# Patient Record
Sex: FEMALE | Race: ASIAN
[De-identification: names, ages, dates, MRNs, and addresses within clinical notes are randomized per-mention and may not be internally consistent; named-entity substitution may affect disease eponyms.]

---

## 2018-05-25 ENCOUNTER — HOSPITAL ENCOUNTER (OUTPATIENT)
Dept: HOSPITAL 17 - PHED | Age: 73
Setting detail: OBSERVATION
LOS: 1 days | Discharge: HOME | End: 2018-05-26
Attending: HOSPITALIST | Admitting: HOSPITALIST
Payer: MEDICARE

## 2018-05-25 VITALS
OXYGEN SATURATION: 98 % | RESPIRATION RATE: 16 BRPM | HEART RATE: 77 BPM | DIASTOLIC BLOOD PRESSURE: 78 MMHG | SYSTOLIC BLOOD PRESSURE: 133 MMHG

## 2018-05-25 VITALS — RESPIRATION RATE: 16 BRPM | SYSTOLIC BLOOD PRESSURE: 144 MMHG | DIASTOLIC BLOOD PRESSURE: 77 MMHG

## 2018-05-25 VITALS
SYSTOLIC BLOOD PRESSURE: 133 MMHG | DIASTOLIC BLOOD PRESSURE: 78 MMHG | HEART RATE: 80 BPM | RESPIRATION RATE: 16 BRPM | OXYGEN SATURATION: 97 %

## 2018-05-25 VITALS
OXYGEN SATURATION: 99 % | SYSTOLIC BLOOD PRESSURE: 146 MMHG | RESPIRATION RATE: 16 BRPM | DIASTOLIC BLOOD PRESSURE: 72 MMHG | HEART RATE: 86 BPM

## 2018-05-25 VITALS
DIASTOLIC BLOOD PRESSURE: 68 MMHG | HEART RATE: 85 BPM | RESPIRATION RATE: 18 BRPM | SYSTOLIC BLOOD PRESSURE: 127 MMHG | TEMPERATURE: 98.3 F | OXYGEN SATURATION: 98 %

## 2018-05-25 VITALS
HEART RATE: 88 BPM | OXYGEN SATURATION: 99 % | SYSTOLIC BLOOD PRESSURE: 139 MMHG | RESPIRATION RATE: 16 BRPM | DIASTOLIC BLOOD PRESSURE: 69 MMHG

## 2018-05-25 VITALS — OXYGEN SATURATION: 100 % | RESPIRATION RATE: 16 BRPM

## 2018-05-25 VITALS — WEIGHT: 112.44 LBS | HEIGHT: 63 IN | BODY MASS INDEX: 19.92 KG/M2

## 2018-05-25 DIAGNOSIS — Z80.9: ICD-10-CM

## 2018-05-25 DIAGNOSIS — K21.9: ICD-10-CM

## 2018-05-25 DIAGNOSIS — E83.51: ICD-10-CM

## 2018-05-25 DIAGNOSIS — R03.0: ICD-10-CM

## 2018-05-25 DIAGNOSIS — R42: Primary | ICD-10-CM

## 2018-05-25 DIAGNOSIS — Z79.82: ICD-10-CM

## 2018-05-25 DIAGNOSIS — R07.89: ICD-10-CM

## 2018-05-25 DIAGNOSIS — R94.31: ICD-10-CM

## 2018-05-25 DIAGNOSIS — Z86.010: ICD-10-CM

## 2018-05-25 DIAGNOSIS — R06.02: ICD-10-CM

## 2018-05-25 LAB
ALBUMIN SERPL-MCNC: 3.9 GM/DL (ref 3.4–5)
ALP SERPL-CCNC: 78 U/L (ref 45–117)
ALT SERPL-CCNC: 23 U/L (ref 10–53)
AST SERPL-CCNC: 26 U/L (ref 15–37)
BASOPHILS # BLD AUTO: 0.1 TH/MM3 (ref 0–0.2)
BASOPHILS NFR BLD: 0.9 % (ref 0–2)
BILIRUB SERPL-MCNC: 0.3 MG/DL (ref 0.2–1)
BUN SERPL-MCNC: 10 MG/DL (ref 7–18)
CALCIUM SERPL-MCNC: 8.9 MG/DL (ref 8.5–10.1)
CHLORIDE SERPL-SCNC: 101 MEQ/L (ref 98–107)
COLOR UR: YELLOW
CREAT SERPL-MCNC: 0.53 MG/DL (ref 0.5–1)
EOSINOPHIL # BLD: 0.3 TH/MM3 (ref 0–0.4)
EOSINOPHIL NFR BLD: 4.9 % (ref 0–4)
ERYTHROCYTE [DISTWIDTH] IN BLOOD BY AUTOMATED COUNT: 12.8 % (ref 11.6–17.2)
GFR SERPLBLD BASED ON 1.73 SQ M-ARVRAT: 112 ML/MIN (ref 89–?)
GLUCOSE SERPL-MCNC: 95 MG/DL (ref 74–106)
GLUCOSE UR STRIP-MCNC: (no result) MG/DL
HCO3 BLD-SCNC: 29.5 MEQ/L (ref 21–32)
HCT VFR BLD CALC: 39.8 % (ref 35–46)
HGB BLD-MCNC: 13.7 GM/DL (ref 11.6–15.3)
HGB UR QL STRIP: (no result)
INR PPP: 1 RATIO
KETONES UR STRIP-MCNC: (no result) MG/DL
LYMPHOCYTES # BLD AUTO: 1.4 TH/MM3 (ref 1–4.8)
LYMPHOCYTES NFR BLD AUTO: 22.1 % (ref 9–44)
MAGNESIUM SERPL-MCNC: 2.5 MG/DL (ref 1.5–2.5)
MCH RBC QN AUTO: 30.2 PG (ref 27–34)
MCHC RBC AUTO-ENTMCNC: 34.5 % (ref 32–36)
MCV RBC AUTO: 87.5 FL (ref 80–100)
MONOCYTE #: 0.4 TH/MM3 (ref 0–0.9)
MONOCYTES NFR BLD: 6.9 % (ref 0–8)
NEUTROPHILS # BLD AUTO: 4.2 TH/MM3 (ref 1.8–7.7)
NEUTROPHILS NFR BLD AUTO: 65.2 % (ref 16–70)
NITRITE UR QL STRIP: (no result)
PLATELET # BLD: 189 TH/MM3 (ref 150–450)
PMV BLD AUTO: 8 FL (ref 7–11)
PROT SERPL-MCNC: 7.8 GM/DL (ref 6.4–8.2)
PROTHROMBIN TIME: 10.2 SEC (ref 9.8–11.6)
RBC # BLD AUTO: 4.55 MIL/MM3 (ref 4–5.3)
SODIUM SERPL-SCNC: 137 MEQ/L (ref 136–145)
SP GR UR STRIP: (no result) (ref 1–1.03)
SQUAMOUS #/AREA URNS HPF: (no result) /HPF (ref 0–5)
TROPONIN I SERPL-MCNC: (no result) NG/ML (ref 0.02–0.05)
URINE LEUKOCYTE ESTERASE: (no result)
WBC # BLD AUTO: 6.4 TH/MM3 (ref 4–11)

## 2018-05-25 PROCEDURE — 84484 ASSAY OF TROPONIN QUANT: CPT

## 2018-05-25 PROCEDURE — 82550 ASSAY OF CK (CPK): CPT

## 2018-05-25 PROCEDURE — 85610 PROTHROMBIN TIME: CPT

## 2018-05-25 PROCEDURE — 83880 ASSAY OF NATRIURETIC PEPTIDE: CPT

## 2018-05-25 PROCEDURE — 85730 THROMBOPLASTIN TIME PARTIAL: CPT

## 2018-05-25 PROCEDURE — 71045 X-RAY EXAM CHEST 1 VIEW: CPT

## 2018-05-25 PROCEDURE — 93005 ELECTROCARDIOGRAM TRACING: CPT

## 2018-05-25 PROCEDURE — 93017 CV STRESS TEST TRACING ONLY: CPT

## 2018-05-25 PROCEDURE — 96361 HYDRATE IV INFUSION ADD-ON: CPT

## 2018-05-25 PROCEDURE — 84443 ASSAY THYROID STIM HORMONE: CPT

## 2018-05-25 PROCEDURE — 81001 URINALYSIS AUTO W/SCOPE: CPT

## 2018-05-25 PROCEDURE — 96360 HYDRATION IV INFUSION INIT: CPT

## 2018-05-25 PROCEDURE — 99285 EMERGENCY DEPT VISIT HI MDM: CPT

## 2018-05-25 PROCEDURE — 85025 COMPLETE CBC W/AUTO DIFF WBC: CPT

## 2018-05-25 PROCEDURE — 70450 CT HEAD/BRAIN W/O DYE: CPT

## 2018-05-25 PROCEDURE — 83735 ASSAY OF MAGNESIUM: CPT

## 2018-05-25 PROCEDURE — G0378 HOSPITAL OBSERVATION PER HR: HCPCS

## 2018-05-25 PROCEDURE — 80053 COMPREHEN METABOLIC PANEL: CPT

## 2018-05-25 RX ADMIN — PHENYTOIN SODIUM SCH MLS/HR: 50 INJECTION INTRAMUSCULAR; INTRAVENOUS at 21:44

## 2018-05-25 NOTE — PD
HPI


Chief Complaint:  Dizziness


Time Seen by Provider:  19:14


Travel History


International Travel<30 days:  No


Contact w/Intl Traveler<30days:  No


Traveled to known affect area:  No





History of Present Illness


HPI


Patient presents to the emergency department complaining of dizziness since 

Monday.  Also reporting dyspnea at rest on exertion especially at night when 

she is lying flat states that she has to wake up in the [place] because she is 

short of breath.  Complaint of substernal chest tightness, nonradiating, 

minutes in duration, alleviated with massage in her chest, no aggravating 

factors.  She is also denying fever, chills, abdominal pain, diarrhea, numbness

, tingling, hematuria, or vertigo.  He does report nausea vomiting stating that 

she vomited twice on Monday (po intake), frontal headache, right flank pain 

since today while she was doing laundry.  Unsure of vision change.





PFSH


Past Medical History


Heart Rhythm Problems:  Yes (DIAGNOSES ONE TIME ONLY LONG AGO)


Cancer:  No


Cardiovascular Problems:  Yes (ABNORMAL EKG)


Endocrine:  Yes


Gastrointestinal Disorders:  Yes


GERD:  Yes


Genitourinary:  No


Immune Disorder:  No


Musculoskeletal:  Yes


Neurologic:  No


Psychiatric:  No


Reproductive:  No


Respiratory:  No


Thyroid Disease:  Yes (STABLE CURRENTLY)


Tetanus Vaccination:  Unknown


Influenza Vaccination:  No





Past Surgical History


Pacemaker:  No


Other Surgery:  Yes (COLON POLYP REMOVAL)





Social History


Alcohol Use:  No


Tobacco Use:  No


Substance Use:  No





Allergies-Medications


(Allergen,Severity, Reaction):  


Coded Allergies:  


     Sulfa (Sulfonamide Antibiotics) (Verified  Allergy, Severe, 5/25/18)


     shellfish derived (Verified  Allergy, Severe, 5/25/18)


Reported Meds & Prescriptions





Reported Meds & Active Scripts


Active


Reported


Multiple Vitamins (Multiple Vitamin) 1 Tab Tab   








Review of Systems


Except as stated in HPI:  all other systems reviewed are Neg





Physical Exam


Narrative


GENERAL: No acute distress.


SKIN: Focused skin assessment warm/dry.


HEAD: Atraumatic. Normocephalic. 


EYES: Pupils equal and round. No scleral icterus. No injection or drainage. 


ENT: No nasal bleeding or discharge.  Mucous membranes pink and moist.


NECK: Trachea midline. No JVD. 


CARDIOVASCULAR: Regular rate and rhythm.  No murmur appreciated.


RESPIRATORY: No accessory muscle use. Clear to auscultation. Breath sounds 

equal bilaterally. 


GASTROINTESTINAL: Abdomen soft, non-tender, nondistended. Hepatic and splenic 

margins not palpable. 


MUSCULOSKELETAL: No obvious deformities. No clubbing.  No cyanosis.  No edema. 


NEUROLOGICAL: Awake and alert. No obvious cranial nerve deficits.  Motor 

grossly within normal limits. Normal speech.


PSYCHIATRIC: Appropriate mood and affect; insight and judgment normal.





Data


Data


Last Documented VS





Vital Signs








  Date Time  Temp Pulse Resp B/P (MAP) Pulse Ox O2 Delivery O2 Flow Rate FiO2


 


5/25/18 20:52  77 16 133/78 (96) 98 Room Air  


 


5/25/18 18:06 98.3       








Orders





 Orders


Electrocardiogram (5/25/18 19:29)


B-Type Natriuretic Peptide (5/25/18 19:29)


Ckmb (Isoenzyme) Profile (5/25/18 19:29)


Complete Blood Count With Diff (5/25/18 19:29)


Comprehensive Metabolic Panel (5/25/18 19:29)


Magnesium (Mg) (5/25/18 19:29)


Prothrombin Time / Inr (Pt) (5/25/18 19:29)


Act Partial Throm Time (Ptt) (5/25/18 19:29)


Troponin I (5/25/18 19:29)


Chest, Single Ap (5/25/18 19:29)


Ecg Monitoring (5/25/18 19:29)


Iv Access Insert/Monitor (5/25/18 19:29)


Oximetry (5/25/18 19:29)


Urinalysis - C+S If Indicated (5/25/18 19:29)


Ct Brain W/O Iv Contrast(Rout) (5/25/18 19:29)


Orthostatic Vital Signs (5/25/18 19:29)


Thyroid Stimulating Hormone (5/25/18 20:32)


Sodium Chlorid 0.9% 500 Ml Inj (Ns 500 M (5/25/18 20:45)


Aspirin (Aspirin) (5/25/18 21:15)


Admit Order (Ed Use Only) (5/25/18 21:03)





Labs





Laboratory Tests








Test


  5/25/18


19:40 5/25/18


19:51


 


White Blood Count 6.4 TH/MM3  


 


Red Blood Count 4.55 MIL/MM3  


 


Hemoglobin 13.7 GM/DL  


 


Hematocrit 39.8 %  


 


Mean Corpuscular Volume 87.5 FL  


 


Mean Corpuscular Hemoglobin 30.2 PG  


 


Mean Corpuscular Hemoglobin


Concent 34.5 % 


  


 


 


Red Cell Distribution Width 12.8 %  


 


Platelet Count 189 TH/MM3  


 


Mean Platelet Volume 8.0 FL  


 


Neutrophils (%) (Auto) 65.2 %  


 


Lymphocytes (%) (Auto) 22.1 %  


 


Monocytes (%) (Auto) 6.9 %  


 


Eosinophils (%) (Auto) 4.9 %  


 


Basophils (%) (Auto) 0.9 %  


 


Neutrophils # (Auto) 4.2 TH/MM3  


 


Lymphocytes # (Auto) 1.4 TH/MM3  


 


Monocytes # (Auto) 0.4 TH/MM3  


 


Eosinophils # (Auto) 0.3 TH/MM3  


 


Basophils # (Auto) 0.1 TH/MM3  


 


CBC Comment DIFF FINAL  


 


Differential Comment   


 


Prothrombin Time 10.2 SEC  


 


Prothromb Time International


Ratio 1.0 RATIO 


  


 


 


Activated Partial


Thromboplast Time 24.7 SEC 


  


 


 


Blood Urea Nitrogen 10 MG/DL  


 


Creatinine 0.53 MG/DL  


 


Random Glucose 95 MG/DL  


 


Total Protein 7.8 GM/DL  


 


Albumin 3.9 GM/DL  


 


Calcium Level 8.9 MG/DL  


 


Magnesium Level 2.5 MG/DL  


 


Alkaline Phosphatase 78 U/L  


 


Aspartate Amino Transf


(AST/SGOT) 26 U/L 


  


 


 


Alanine Aminotransferase


(ALT/SGPT) 23 U/L 


  


 


 


Total Bilirubin 0.3 MG/DL  


 


Sodium Level 137 MEQ/L  


 


Potassium Level 3.7 MEQ/L  


 


Chloride Level 101 MEQ/L  


 


Carbon Dioxide Level 29.5 MEQ/L  


 


Anion Gap 7 MEQ/L  


 


Estimat Glomerular Filtration


Rate 112 ML/MIN 


  


 


 


Total Creatine Kinase 45 U/L  


 


Troponin I


  LESS THAN 0.02


NG/ML 


 


 


B-Type Natriuretic Peptide 6 PG/ML  


 


Urine Color  YELLOW 


 


Urine Turbidity  CLEAR 


 


Urine pH  6.0 


 


Urine Specific Gravity


  


  LESS/EQUAL


1.005


 


Urine Protein  NEG mg/dL 


 


Urine Glucose (UA)  NEG mg/dL 


 


Urine Ketones  NEG mg/dL 


 


Urine Occult Blood  NEG 


 


Urine Nitrite  NEG 


 


Urine Bilirubin  NEG 


 


Urine Urobilinogen  0.2 MG/DL 


 


Urine Leukocyte Esterase  NEG 


 


Urine Squamous Epithelial


Cells 


  0-5 /hpf 


 


 


Microscopic Urinalysis Comment


  


  CULT NOT


INDICATED











MDM


Medical Decision Making


Medical Screen Exam Complete:  Yes


Emergency Medical Condition:  Yes


Interpretation(s)


ECG: Sinus rhythm, rate 73, normal axis, normal intervals QTc 409, T-wave in 1 

and aVL, T-wave inversion in V1 and V2


Labs: CBC, Colace, urinalysis, chemistry, BNP within normal limits.  Cardiac 

enzymes within normal limits.


Last Impressions








Head CT 5/25/18 1929 Signed





Impressions: 





 CONCLUSION:





 1.  No acute intracranial abnormality.





  





 


 


Chest X-Ray 5/25/18 1929 Signed





Impressions: 





 CONCLUSION: 





 Lungs are clear.





  





 








Differential Diagnosis


Orthostasis, CVA or intracranial abnormality, ACS, CHF, musculoskeletal chest 

pain, UTI


Narrative Course


Patient presents to the emergency department complaining of dizziness and chest 

tightness.  Placed on cardiac monitor, IV access obtain, EKG done, chest x-ray 

and labs ordered, as well as orthostats.


The nurse attempted to do orthostats but patient was not able to go from 

sitting to standing secondary to dizziness.  No evidence of orthostasis when 

she went from lying to sitting based on heart rate and blood pressure but 

patient reports dizziness.


2033: 500 cc IV normal saline ordered as well as TSH.  Aspirin 325 mg p.o. also 

ordered.


2137: Patient reports that the dizziness is better after receiving IV fluids.  

She is able to sit up and eat in the ER.  TSH is pending at time of admission, 

admit team will follow.





Diagnosis





 Primary Impression:  


 Dizziness


 Additional Impression:  


 Chest pain


 Qualified Codes:  R07.9 - Chest pain, unspecified





Admitting Information


Admitting Physician Requests:  Admit


Condition:  Stable











Myah Jones MD May 25, 2018 20:29

## 2018-05-25 NOTE — RADRPT
EXAM DATE:  5/25/2018 8:05 PM EDT

AGE/SEX:        73 years / Female



INDICATIONS:  Dizziness.



CLINICAL DATA:  This is the patient's initial encounter. Patient reports that signs and symptoms have
 been present for 4 - 6 days and indicates a pain score of 0/10. 

                                                                          

MEDICAL/SURGICAL HISTORY:   . .



RADIATION DOSE:  48.37 CTDI (mGy)







COMPARISON:      No prior [facility] exams available for comparison.





TECHNIQUE:  CT of the head without contrast.  Using automated exposure control and adjustment of the 
mA and/or kV according to patient size, radiation dose was kept as low as reasonably achievable to ob
tain optimal diagnostic quality images.



FINDINGS: 

Cerebrum:  The ventricles are normal for age.  No evidence of midline shift, mass lesion, hemorrhage 
or acute infarction.  No extraaxial fluid collections are seen.

Posterior Fossa:  The cerebellum and brainstem are intact.  The 4th ventricle is midline.  The cerebe
llopontine angle is unremarkable.

Extracranial:  The visualized portion of the orbits is intact.

Skull:  The calvaria is intact.  No evidence of skull fracture.



CONCLUSION:

1.  No acute intracranial abnormality.



Electronically signed by: Paxton Figueroa MD  5/25/2018 8:07 PM EDT

## 2018-05-25 NOTE — RADRPT
EXAM DATE:  5/25/2018 8:14 PM EDT

AGE/SEX:        73 years / Female



INDICATIONS:  Dizziness, weakness for 4 days



CLINICAL DATA:  This is the patient's initial encounter. Patient reports that signs and symptoms have
 been present for 4 - 6 days and indicates a pain score of 0/10. 

                                                                          

MEDICAL/SURGICAL HISTORY:       None. None.



COMPARISON:      No prior [facility] exams available for comparison.



FINDINGS:  

A single AP view of the chest demonstrates the lungs to be symmetrically aerated without evidence of 
mass, infiltrate or effusion.  The cardiomediastinal contours are unremarkable.  Osseous structures a
re intact.  





CONCLUSION: 

Lungs are clear.



Electronically signed by: Gume Delcid MD  5/25/2018 8:15 PM EDT

## 2018-05-26 VITALS
SYSTOLIC BLOOD PRESSURE: 169 MMHG | HEART RATE: 98 BPM | OXYGEN SATURATION: 98 % | TEMPERATURE: 98 F | RESPIRATION RATE: 16 BRPM | DIASTOLIC BLOOD PRESSURE: 82 MMHG

## 2018-05-26 VITALS
HEART RATE: 89 BPM | TEMPERATURE: 98.8 F | SYSTOLIC BLOOD PRESSURE: 172 MMHG | RESPIRATION RATE: 16 BRPM | OXYGEN SATURATION: 96 % | DIASTOLIC BLOOD PRESSURE: 81 MMHG

## 2018-05-26 VITALS
DIASTOLIC BLOOD PRESSURE: 86 MMHG | TEMPERATURE: 98.2 F | OXYGEN SATURATION: 98 % | RESPIRATION RATE: 16 BRPM | SYSTOLIC BLOOD PRESSURE: 139 MMHG | HEART RATE: 85 BPM

## 2018-05-26 VITALS — HEART RATE: 83 BPM | SYSTOLIC BLOOD PRESSURE: 129 MMHG | DIASTOLIC BLOOD PRESSURE: 72 MMHG

## 2018-05-26 VITALS — HEART RATE: 91 BPM | SYSTOLIC BLOOD PRESSURE: 125 MMHG | DIASTOLIC BLOOD PRESSURE: 72 MMHG

## 2018-05-26 LAB
ALBUMIN SERPL-MCNC: 3.2 GM/DL (ref 3.4–5)
ALP SERPL-CCNC: 57 U/L (ref 45–117)
ALT SERPL-CCNC: 20 U/L (ref 10–53)
AST SERPL-CCNC: 20 U/L (ref 15–37)
BASOPHILS # BLD AUTO: 0.1 TH/MM3 (ref 0–0.2)
BASOPHILS NFR BLD: 1.4 % (ref 0–2)
BILIRUB SERPL-MCNC: 0.5 MG/DL (ref 0.2–1)
BUN SERPL-MCNC: 10 MG/DL (ref 7–18)
CALCIUM SERPL-MCNC: 8.3 MG/DL (ref 8.5–10.1)
CHLORIDE SERPL-SCNC: 111 MEQ/L (ref 98–107)
CREAT SERPL-MCNC: 0.46 MG/DL (ref 0.5–1)
EOSINOPHIL # BLD: 0.4 TH/MM3 (ref 0–0.4)
EOSINOPHIL NFR BLD: 7.5 % (ref 0–4)
ERYTHROCYTE [DISTWIDTH] IN BLOOD BY AUTOMATED COUNT: 12.3 % (ref 11.6–17.2)
GFR SERPLBLD BASED ON 1.73 SQ M-ARVRAT: 132 ML/MIN (ref 89–?)
GLUCOSE SERPL-MCNC: 93 MG/DL (ref 74–106)
HCO3 BLD-SCNC: 26.1 MEQ/L (ref 21–32)
HCT VFR BLD CALC: 37.7 % (ref 35–46)
HGB BLD-MCNC: 12.4 GM/DL (ref 11.6–15.3)
LYMPHOCYTES # BLD AUTO: 1.3 TH/MM3 (ref 1–4.8)
LYMPHOCYTES NFR BLD AUTO: 25.2 % (ref 9–44)
MCH RBC QN AUTO: 29.2 PG (ref 27–34)
MCHC RBC AUTO-ENTMCNC: 33 % (ref 32–36)
MCV RBC AUTO: 88.6 FL (ref 80–100)
MONOCYTE #: 0.4 TH/MM3 (ref 0–0.9)
MONOCYTES NFR BLD: 8.2 % (ref 0–8)
NEUTROPHILS # BLD AUTO: 3 TH/MM3 (ref 1.8–7.7)
NEUTROPHILS NFR BLD AUTO: 57.7 % (ref 16–70)
PLATELET # BLD: 150 TH/MM3 (ref 150–450)
PMV BLD AUTO: 8 FL (ref 7–11)
PROT SERPL-MCNC: 6.5 GM/DL (ref 6.4–8.2)
RBC # BLD AUTO: 4.25 MIL/MM3 (ref 4–5.3)
SODIUM SERPL-SCNC: 145 MEQ/L (ref 136–145)
TROPONIN I SERPL-MCNC: (no result) NG/ML (ref 0.02–0.05)
WBC # BLD AUTO: 5.2 TH/MM3 (ref 4–11)

## 2018-05-26 RX ADMIN — PHENYTOIN SODIUM SCH MLS/HR: 50 INJECTION INTRAMUSCULAR; INTRAVENOUS at 07:02

## 2018-05-26 NOTE — HHI.HP
__________________________________________________





[facility]


Service


[facility]


Primary Care Physician


Ana Laura Chung MD


Admission Diagnosis





dizziness, chest pain


Diagnoses:  


(1) Chest pain


Diagnosis:  Principal





(2) Shortness of breath


Diagnosis:  Principal





(3) Dizziness


Diagnosis:  Principal





Chief Complaint:  


Dizziness, nausea, shortness of breath and chest discomfort


Travel History


International Travel<30 Days:  No


Contact w/Intl Traveler <30 Da:  No


Traveled to Known Affected Are:  No


History of Present Illness


Written by Quintin Dobson, acting as scribe for Dr. Morley on 18 at 08:

54. 





73-year-old female with history of hypothyroidism who presented the hospital 

because of a 5 day history of dizziness, shortness of breath, chest discomfort.

  Patient states that on Monday of this week she was working in the [employer] 

doing laundry.  She is trying to get his stay not mix nail polish remover with 

OxyContin, while she was mixing that together with her hand she felt a hot 

sensation so she stopped doing that and watch her hands.  Approximately 2 hours 

after that she started developing some dizziness in which she describes it as 

she was swaying.  She did not have any spinning sensation.  At that time she 

felt hungry and she went and ate something and then shortly after that she 

developed nausea and vomiting 2.  The patient went home after that and she 

laid down.  Every time that she got up she started developing lightheadedness 

and dizziness but no recurrent nausea and vomiting.  She also started 

developing heaviness sensation in her chest in which she indicates was worse 

with exertion.  While at rest the discomfort was a 5/10, during exertion with 7/

10.  She continued indicate that whenever she exerted herself the heaviness 

would get worse, she did have worsening dizziness as well as shortness of 

breath.  Because this persisted for 5 days she came to emergency department for 

evaluation.  In the past she did see a cardiologist Dr. Deshaun Jones, patient did 

have a myocardial perfusion study done in  which was normal.  The patient 

presently is still experiencing the discomfort.  Patient had workup done and 

essentially unremarkable.  Patient was recommended observation to evaluate her 

discomfort and symptoms.





Review of Systems


Constitutional:  COMPLAINS OF: Dizziness


Respiratory:  COMPLAINS OF: Shortness of breath


Cardiovascular:  COMPLAINS OF: Chest pain


Except as stated in HPI:  all other systems reviewed are Neg





Past Family Social History


Past Medical History


Hypothyroidism


History of colon polyp


Past Surgical History


Colonoscopy with polyp removal


Reported Medications





Reported Meds & Active Scripts


Active


Reported


Multiple Vitamins (Multiple Vitamin) 1 Tab Tab


Allergies:  


Coded Allergies:  


     Sulfa (Sulfonamide Antibiotics) (Verified  Allergy, Severe, 18)


     shellfish derived (Verified  Allergy, Severe, 18)


Family History


Family history was reviewed and is significant for father having thyroid 

problems and liver disease.  Mother with cancer of unknown kind


Social History


Patient denies any tobacco, alcohol or illicit drug





Physical Exam


Vital Signs





Vital Signs








  Date Time  Temp Pulse Resp B/P (MAP) Pulse Ox O2 Delivery O2 Flow Rate FiO2


 


18 08:46 98.2 85 16 139/86 (103) 98   


 


18 04:00 98.0 98 16 169/82 (111) 98   


 


18 00:00 98.8 89 16 172/81 (111) 96   


 


18 22:57        


 


18 22:22  88 16 139/69 (92) 99 Room Air  


 


18 21:52  86 16 146/72 (96) 99 Room Air  


 


18 21:22  80 16 133/78 (96) 97 Room Air  


 


18 20:52  77 16 133/78 (96) 98 Room Air  


 


18 20:16  6 16 139/73 (95)    





  79 16 144/77 (99)    


 


18 19:07   16  100 Room Air  


 


18 19:07  80 16  100 Room Air  


 


18 18:06 98.3 85 18 127/68 (87) 98   








Physical Exam


GENERAL: Well-developed, well-nourished, in no acute distress. alert and 

orientated


HEENT: Head is normocephalic without any lesions or masses noted. Facial 

features are symmetric. Eyes: Pupils equal round reactive to light. Extraocular 

muscles are intact. Conjunctivae were clear. Oropharyngeal: Pharynx without any 

erythema edema. Tongue is midline without deviation. Buccal mucosa is moist 

without any masses or lesions


NECK: Supple without any masses. Trachea midline no deviation. No JVD, no 

bruits are appreciated


CARDIAC: Regular rhythm, regular rate. S1/S2 are heard. No murmurs gallops or 

rubs.


LUNGS: Clear to auscultation bilaterally. No wheeze, rhonchi or rales. No use 

of accessory muscles on inspiration or expiration.


ABDOMEN: Soft, nontender. Nondistended. Bowel sounds heard in all 4 quadrants. 

No organomegaly or masses. Negative rebound, negative guarding


EXTREMITIES: No edema, pulses are equal bilaterally. No cyanosis or clubbing


NEUROLOGY: Mood and affect appear appropriate. Cranial nerves II through XII 

grossly intact. Muscle strength 5/5 in upper and lower extremities bilaterally. 

Deep tendon reflexes are 2+ in upper and lower extremities bilaterally.


Laboratory





Laboratory Tests








Test


  18


19:40 18


19:51 18


00:50 18


06:00


 


White Blood Count 6.4    5.2 


 


Red Blood Count 4.55    4.25 


 


Hemoglobin 13.7    12.4 


 


Hematocrit 39.8    37.7 


 


Mean Corpuscular Volume 87.5    88.6 


 


Mean Corpuscular Hemoglobin 30.2    29.2 


 


Mean Corpuscular Hemoglobin


Concent 34.5 


  


  


  33.0 


 


 


Red Cell Distribution Width 12.8    12.3 


 


Platelet Count 189    150 


 


Mean Platelet Volume 8.0    8.0 


 


Neutrophils (%) (Auto) 65.2    57.7 


 


Lymphocytes (%) (Auto) 22.1    25.2 


 


Monocytes (%) (Auto) 6.9    8.2 


 


Eosinophils (%) (Auto) 4.9    7.5 


 


Basophils (%) (Auto) 0.9    1.4 


 


Neutrophils # (Auto) 4.2    3.0 


 


Lymphocytes # (Auto) 1.4    1.3 


 


Monocytes # (Auto) 0.4    0.4 


 


Eosinophils # (Auto) 0.3    0.4 


 


Basophils # (Auto) 0.1    0.1 


 


CBC Comment DIFF FINAL    DIFF FINAL 


 


Differential Comment      


 


Prothrombin Time 10.2    


 


Prothromb Time International


Ratio 1.0 


  


  


  


 


 


Activated Partial


Thromboplast Time 24.7 


  


  


  


 


 


Blood Urea Nitrogen 10    10 


 


Creatinine 0.53    0.46 


 


Random Glucose 95    93 


 


Total Protein 7.8    6.5 


 


Albumin 3.9    3.2 


 


Calcium Level 8.9    8.3 


 


Magnesium Level 2.5    


 


Alkaline Phosphatase 78    57 


 


Aspartate Amino Transf


(AST/SGOT) 26 


  


  


  20 


 


 


Alanine Aminotransferase


(ALT/SGPT) 23 


  


  


  20 


 


 


Total Bilirubin 0.3    0.5 


 


Sodium Level 137    145 


 


Potassium Level 3.7    3.9 


 


Chloride Level 101    111 


 


Carbon Dioxide Level 29.5    26.1 


 


Anion Gap 7    8 


 


Estimat Glomerular Filtration


Rate 112 


  


  


  132 


 


 


Total Creatine Kinase 45    


 


Troponin I LESS THAN 0.02   LESS THAN 0.02  LESS THAN 0.02 


 


B-Type Natriuretic Peptide 6    


 


Urine Color  YELLOW   


 


Urine Turbidity  CLEAR   


 


Urine pH  6.0   


 


Urine Specific Gravity


  


  LESS/EQUAL


1.005 


  


 


 


Urine Protein  NEG   


 


Urine Glucose (UA)  NEG   


 


Urine Ketones  NEG   


 


Urine Occult Blood  NEG   


 


Urine Nitrite  NEG   


 


Urine Bilirubin  NEG   


 


Urine Urobilinogen  0.2   


 


Urine Leukocyte Esterase  NEG   


 


Urine Squamous Epithelial


Cells 


  0-5 


  


  


 


 


Microscopic Urinalysis Comment


  


  CULT NOT


INDICATED 


  


 








Result Diagram:  


18





Imaging





Last Impressions








Head CT 18 Signed





Impressions: 





 CONCLUSION:





 1.  No acute intracranial abnormality.





  





 


 


Chest X-Ray 18 Signed





Impressions: 





 CONCLUSION: 





 Lungs are clear.





  





 











Caprini VTE Risk Assessment


Caprini VTE Risk Assessment:  Mod/High Risk (score >= 2)


Caprini Risk Assessment Model











 Point Value = 1          Point Value = 2  Point Value = 3  Point Value = 5


 


Age 41-60


Minor surgery


BMI > 25 kg/m2


Swollen legs


Varicose veins


Pregnancy or postpartum


History of unexplained or recurrent


   spontaneous 


Oral contraceptives or hormone


   replacement


Sepsis (< 1 month)


Serious lung disease, including


   pneumonia (< 1 month)


Abnormal pulmonary function


Acute myocardial infarction


Congestive heart failure (< 1 month)


History of inflammatory bowel disease


Medical patient at bed rest Age 61-74


Arthroscopic surgery


Major open surgery (> 45 min)


Laparoscopic surgery (> 45 min)


Malignancy


Confined to bed (> 72 hours)


Immobilizing plaster cast


Central venous access Age >= 75


History of VTE


Family history of VTE


Factor V Leiden


Prothrombin 50763E


Lupus anticoagulant


Anticardiolipin antibodies


Elevated serum homocysteine


Heparin-induced thrombocytopenia


Other congenital or acquired


   thrombophilia Stroke (< 1 month)


Elective arthroplasty


Hip, pelvis, or leg fracture


Acute spinal cord injury (< 1 month)








Prophylaxis Regimen











   Total Risk


Factor Score Risk Level Prophylaxis Regimen


 


0-1      Low Early ambulation


 


2 Moderate Order ONE of the following:


*Sequential Compression Device (SCD)


*Heparin 5000 units SQ BID


 


3-4 Higher Order ONE of the following medications:


*Heparin 5000 units SQ TID


*Enoxaparin/Lovenox 40 mg SQ daily (WT < 150 kg, CrCl > 30 mL/min)


*Enoxaparin/Lovenox 30 mg SQ daily (WT < 150 kg, CrCl > 10-29 mL/min)


*Enoxaparin/Lovenox 30 mg SQ BID (WT < 150 kg, CrCl > 30 mL/min)


AND/OR


*Sequential Compression Device (SCD)


 


5 or more Highest Order ONE of the following medications:


*Heparin 5000 units SQ TID (Preferred with Epidurals)


*Enoxaparin/Lovenox 40 mg SQ daily (WT < 150 kg, CrCl > 30 mL/min)


*Enoxaparin/Lovenox 30 mg SQ daily (WT < 150 kg, CrCl > 10-29 mL/min)


*Enoxaparin/Lovenox 30 mg SQ BID (WT < 150 kg, CrCl > 30 mL/min)


AND


*Sequential Compression Device (SCD)











Assessment and Plan


Problem List:  


(1) Chest pain


ICD Code:  R07.9 - Chest pain, unspecified


Status:  Acute


Assessment and Plan


Chest discomfort with associated dizziness, shortness of breath on exertion


-Patient risk factor is age, postmenopausal without exogenous hormones


-Patient has been ruled out for acute coronary event with serial cardiac 

enzymes that have remained negative


-EKGs were reviewed which did not indicate any acute abnormalities


-Exercise stress test was performed and indicated no signs of ischemia


-Patient was given aspirin in the emergency department


-We will give patient Antivert 25 mg 1 and monitor response





Elevated blood pressure readings


-Patient denies any previous history of hypertension, could be secondary to 

stress


-Patient blood pressure readings have ranged anywhere from 139//81


-Orthostatic vitals were normal


-Patient needs to continue follow-up with her prior medical doctor for blood 

pressure monitoring





History of hyperthyroidism


-TSH was normal at 0.832





DVT prevention


-Sequential compression devices





Discharge disposition





Discharge home in stable condition





Activity: Ad steve.





Diet: Healthy heart diet





Medication per medication reconciliation





Follow-up with primary medical doctor in 1 week





This note was transcribed by oliver Dobson.  I, Dr. Deshaun Morley 

personally performed the history, physical exam, and medical decision making; 

and confirmed the accuracy of the information in the transcribed note.





Authenticated by Dr. Deshaun Morley on 18 at 08:54.


Code Status


Full code


Discussed Condition With


Patient





Problem Qualifiers





(1) Chest pain:  


Qualified Codes:  R07.9 - Chest pain, unspecified








Quintin Dobson May 26, 2018 08:55


Deshaun Morley MD May 26, 2018 08:56

## 2018-05-26 NOTE — HHI.DCPOC
Discharge Care Plan


Diagnosis:  


(1) Chest pain


(2) Dizziness


(3) Shortness of breath


Goals to Promote Your Health


* To prevent worsening of your condition and complications


* To maintain your health at the optimal level


Directions to Meet Your Goals


*** Take your medications as prescribed


*** Follow your dietary instruction


*** Follow activity as directed








*** Keep your appointments as scheduled


*** Take your immunizations and boosters as scheduled


*** If your symptoms worsen call your PCP, if no PCP go to Urgent Care Center 

or Emergency Room***


*** Smoking is Dangerous to Your Health. Avoid second hand smoke***


***Call the 24-hour hour crisis hotline for domestic abuse at 1-619.619.3445***











Quintin Dobson May 26, 2018 10:44

## 2018-05-26 NOTE — EKG
Date Performed: 2018       Time Performed: 19:37:11

 

PTAGE:      73 years

 

EKG:      Sinus rhythm 

 

 NORMAL ECG Compared to 

 

 PREVIOUS TRACING            , anterior T-wave changes have improved. PREVIOUS TRACIN2012 10
.07

 

DOCTOR:   Joe Spence  Interpretating Date/Time  2018 13:43:04

## 2018-05-26 NOTE — TR
Date Performed: 05/26/2018       Time Performed: 10:21:47

 

DOCTOR:      Joe Spence 

 

DRUG LIST:     

CLINICAL HISTORY:      CHEST PAIN

REASON FOR TEST:     

REASON FOR ENDING:      Completed Protocol

OBSERVATION:      Arrhythmia: None Chest Pain: None

CONCLUSION:      Patient tolerated ANCA protocol with Total Exercise Time=3:59 Maximum CR=359 % Max 
HR Ltomdvxt=932.0% Maximum EQ=762/78, Testing stopped secondary to goals acheived and physical endura
nce. During peak exercise, patient had upsloping ST segments, No significant ST depressions, HR and B
P appropriate response to exercise. Recovery phase patient with mild dizziness, HR and BP returned to
 baseline

COMMENTS:      Conclusion: Normal treadmill exercise.  No evidence of ischemia.

## 2018-05-27 NOTE — EKG
Date Performed: 2018       Time Performed: 08:57:42

 

PTAGE:      73 years

 

EKG:      Sinus rhythm 

 

 LOW QRS VOLTAGE IN PRECORDIAL LEADS NONSPECIFIC T-WAVE ABNORMALITY BORDERLINE ECG Compared to 

 

 PREVIOUS TRACING            , the ST-T changes are new. PREVIOUS TRACIN2018 19.37

 

DOCTOR:   Joe Spence  Interpretating Date/Time  2018 13:11:00